# Patient Record
Sex: FEMALE | Race: WHITE | Employment: PART TIME | URBAN - METROPOLITAN AREA
[De-identification: names, ages, dates, MRNs, and addresses within clinical notes are randomized per-mention and may not be internally consistent; named-entity substitution may affect disease eponyms.]

---
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
## 2020-02-19 ENCOUNTER — HOSPITAL ENCOUNTER (OUTPATIENT)
Age: 20
Discharge: HOME | End: 2020-02-19
Payer: COMMERCIAL

## 2020-02-19 VITALS
DIASTOLIC BLOOD PRESSURE: 63 MMHG | WEIGHT: 130 LBS | BODY MASS INDEX: 25 KG/M2 | HEART RATE: 78 BPM | RESPIRATION RATE: 16 BRPM | TEMPERATURE: 98 F | OXYGEN SATURATION: 99 % | SYSTOLIC BLOOD PRESSURE: 106 MMHG

## 2020-02-19 DIAGNOSIS — O92.79 CLOGGED DUCT, POSTPARTUM: Primary | ICD-10-CM

## 2020-02-19 LAB
#MXD IC: 0.8 X10ˆ3/UL (ref 0.1–1)
HCT VFR BLD AUTO: 34 % (ref 35–48)
HGB BLD-MCNC: 11.4 G/DL (ref 12–16)
LYMPHOCYTES # BLD AUTO: 3.1 X10ˆ3/UL (ref 1.5–5)
LYMPHOCYTES NFR BLD AUTO: 32.9 %
MCH RBC QN AUTO: 27.3 PG (ref 26–34)
MCHC RBC AUTO-ENTMCNC: 33.5 G/DL (ref 31–37)
MCV RBC AUTO: 81.5 FL (ref 80–100)
MIXED CELL %: 8.1 %
NEUTROPHILS # BLD AUTO: 5.4 X10ˆ3/UL (ref 1.5–7.7)
NEUTROPHILS NFR BLD AUTO: 59 %
PLATELET # BLD AUTO: 269 X10ˆ3/UL (ref 150–450)
RBC # BLD AUTO: 4.17 X10ˆ6/UL (ref 3.8–5.3)
WBC # BLD AUTO: 9.3 X10ˆ3/UL (ref 4–11)

## 2020-02-19 PROCEDURE — 36415 COLL VENOUS BLD VENIPUNCTURE: CPT

## 2020-02-19 PROCEDURE — 85025 COMPLETE CBC W/AUTO DIFF WBC: CPT | Performed by: NURSE PRACTITIONER

## 2020-02-19 PROCEDURE — 99214 OFFICE O/P EST MOD 30 MIN: CPT

## 2020-02-19 PROCEDURE — 99213 OFFICE O/P EST LOW 20 MIN: CPT

## 2020-02-19 RX ORDER — AMOXICILLIN AND CLAVULANATE POTASSIUM 875; 125 MG/1; MG/1
1 TABLET, FILM COATED ORAL 2 TIMES DAILY
Qty: 20 TABLET | Refills: 0 | Status: SHIPPED | OUTPATIENT
Start: 2020-02-19 | End: 2020-02-29

## 2020-02-19 NOTE — ED INITIAL ASSESSMENT (HPI)
Pt here c/o rt breast pain for last couple days. Pt is breast feeding. States she feels like she had a temp last night.    Denies redness to breast.

## 2020-02-19 NOTE — ED PROVIDER NOTES
Patient Seen in: [address] [facility]      History   Patient presents with:  Breast Problem    Stated Complaint: breast pain/possible clogged milk duct    43-year-old female who presents to the immediate care with complaints of right lower bashir Pulse 78   Resp 16   Temp 97.8 °F (36.6 °C)   Temp src Temporal   SpO2 99 %   O2 Device None (Room air)       Current:/63   Pulse 78   Temp 97.8 °F (36.6 °C) (Temporal)   Resp 16   Wt 59 kg   SpO2 99%   Breastfeeding Unknown   BMI 24.56 kg/m² I have discussed with the patient and/or family the results of test, differential diagnosis, treatment plan, warning signs and symptoms which should prompt immediate return.   The patient and/or family expressed clear understanding of these instructions and

## 2020-12-31 ENCOUNTER — HOSPITAL ENCOUNTER (OUTPATIENT)
Age: 20
Discharge: OTHER INSTITUTION | End: 2020-12-31
Payer: COMMERCIAL

## 2020-12-31 VITALS
SYSTOLIC BLOOD PRESSURE: 123 MMHG | BODY MASS INDEX: 25.76 KG/M2 | WEIGHT: 140 LBS | TEMPERATURE: 98 F | HEART RATE: 64 BPM | OXYGEN SATURATION: 99 % | RESPIRATION RATE: 16 BRPM | HEIGHT: 62 IN | DIASTOLIC BLOOD PRESSURE: 65 MMHG

## 2020-12-31 DIAGNOSIS — M79.661 PAIN OF RIGHT CALF: Primary | ICD-10-CM

## 2020-12-31 PROCEDURE — 99203 OFFICE O/P NEW LOW 30 MIN: CPT | Performed by: NURSE PRACTITIONER

## 2020-12-31 NOTE — ED INITIAL ASSESSMENT (HPI)
Patient reports right calf pain that began yesterday. No swelling redness or warmth. Patient reports taking BCP and mother with history of blood clots.

## 2020-12-31 NOTE — ED PROVIDER NOTES
Patient Seen in: Immediate [address] [place] [address]      History   Patient presents with:  Pain    Stated Complaint: Leg pain     72-year-old female presents to the IC with complaints of right calf pain for the last couple days.   Patient did recently switch her birth Current:/65   Pulse 64   Temp 98.1 °F (36.7 °C) (Temporal)   Resp 16   Ht 157.5 cm (5' 2\")   Wt 63.5 kg   LMP 11/30/2020   SpO2 99%   Breastfeeding Yes   BMI 25.61 kg/m²         Physical Exam  Vitals signs and nursing note reviewed.    Constitutional I have discussed with the patient and/or family the results of test, differential diagnosis, treatment plan, warning signs and symptoms which should prompt immediate return.   The patient and/or family expressed clear understanding of these instructions and

## 2021-01-07 ENCOUNTER — HOSPITAL ENCOUNTER (OUTPATIENT)
Age: 21
Discharge: HOME | End: 2021-01-07
Payer: COMMERCIAL

## 2021-01-07 VITALS
TEMPERATURE: 98 F | SYSTOLIC BLOOD PRESSURE: 111 MMHG | HEART RATE: 78 BPM | OXYGEN SATURATION: 98 % | RESPIRATION RATE: 16 BRPM | DIASTOLIC BLOOD PRESSURE: 67 MMHG

## 2021-01-07 DIAGNOSIS — R43.2 LOSS OF TASTE: Primary | ICD-10-CM

## 2021-01-07 PROCEDURE — 99212 OFFICE O/P EST SF 10 MIN: CPT | Performed by: NURSE PRACTITIONER

## 2021-01-07 RX ORDER — LEVOTHYROXINE SODIUM 0.1 MG/1
100 TABLET ORAL
COMMUNITY
End: 2021-09-07

## 2021-01-07 NOTE — ED PROVIDER NOTES
Patient Seen in: Immediate [address] [facility]      History   Patient presents with:  Loss Of Smell Or Taste  Fatigue    Stated Complaint: loss of taste/smell    HPI/Subjective:   19-year-old female presents to the IC with 2 days of loss of taste and smell.   Mara Garcia (Temporal)   Resp 16   LMP 12/31/2020 (Exact Date)   SpO2 98%   Breastfeeding No         Physical Exam    GENERAL: The patient is well-developed well-nourished nontoxic, non-ill-appearing  HEENT: Normocephalic. Atraumatic.   Extraocular motions are intact

## 2021-01-08 LAB — SARS-COV-2 RNA,QUAL, RT-PCR: DETECTED

## 2021-09-07 ENCOUNTER — OFFICE VISIT (OUTPATIENT)
Dept: FAMILY MEDICINE CLINIC | Facility: CLINIC | Age: 21
End: 2021-09-07
Payer: MEDICAID

## 2021-09-07 VITALS
HEIGHT: 61 IN | HEART RATE: 90 BPM | WEIGHT: 146 LBS | SYSTOLIC BLOOD PRESSURE: 116 MMHG | OXYGEN SATURATION: 99 % | DIASTOLIC BLOOD PRESSURE: 72 MMHG | TEMPERATURE: 98 F | RESPIRATION RATE: 18 BRPM | BODY MASS INDEX: 27.56 KG/M2

## 2021-09-07 DIAGNOSIS — Z00.00 PHYSICAL EXAM, ANNUAL: Primary | ICD-10-CM

## 2021-09-07 DIAGNOSIS — Z76.89 ENCOUNTER TO ESTABLISH CARE: ICD-10-CM

## 2021-09-07 PROCEDURE — 3078F DIAST BP <80 MM HG: CPT | Performed by: FAMILY MEDICINE

## 2021-09-07 PROCEDURE — 3008F BODY MASS INDEX DOCD: CPT | Performed by: FAMILY MEDICINE

## 2021-09-07 PROCEDURE — 99385 PREV VISIT NEW AGE 18-39: CPT | Performed by: FAMILY MEDICINE

## 2021-09-07 PROCEDURE — 3074F SYST BP LT 130 MM HG: CPT | Performed by: FAMILY MEDICINE

## 2021-09-07 RX ORDER — FERROUS SULFATE 325(65) MG
325 TABLET ORAL 2 TIMES DAILY
COMMUNITY
Start: 2021-06-11

## 2021-09-07 RX ORDER — ASCORBIC ACID 100 MG
TABLET,CHEWABLE ORAL
COMMUNITY

## 2021-09-07 RX ORDER — VITAMIN B COMPLEX
TABLET ORAL
COMMUNITY

## 2021-09-07 RX ORDER — LEVOTHYROXINE SODIUM 137 UG/1
137 TABLET ORAL
COMMUNITY
Start: 2021-07-07

## 2021-09-07 NOTE — PROGRESS NOTES
HPI:   Hua Rose is a 24year old female who presents for a complete physical exam.   No concerns today. Hypothyroidism on meds sees endocrine labs done recently. No allergies   No breast no colon cancer in family.     Pap - 2021 neg  Mammogram   Has Outpatient Medications   Medication Sig Dispense Refill   • levothyroxine 137 MCG Oral Tab Take 137 mcg by mouth before breakfast.     • Ascorbic Acid (VITAMIN C) 100 MG Oral Chew Tab Chew by mouth.      • Cholecalciferol (VITAMIN D) 25 MCG (1000 UT) Oral T normocephalic,ears and throat are clear  EYES:PERRLA, conjunctiva are clear  NECK: supple,no adenopathy,no bruits. No thyromegaly  BREAST: by ob.   LUNGS: clear to auscultation  CARDIO: RRR without murmur  GI: soft, good BS's,no masses, HSM or tenderness  G

## 2021-09-07 NOTE — PATIENT INSTRUCTIONS
Exercise for a Healthier Heart     Exercise with a friend. When activity is fun, you're more likely to stick with it. You may wonder how you can improve the health of your heart. If you’re thinking about exercise, you’re on the right track.  You don’t n you enjoy. Walking is one of the easiest things you can do. You can also try swimming, riding a bike, dancing, or taking an exercise class.   Stop exercising and call your doctor if you:  · Have chest pain or feel dizzy or lightheaded  · Feel burning, tight molasses. Cut your usual amount by half. · Use non-sugar sweeteners. Stevia, aspartame, and sucralose can satisfy a sweet tooth without adding calories. · Swap out sugar-filled soda and other drinks. Buy sugar-free or low-calorie beverages.  Remember albaro your food with herbs and flavorings. Try lemon, garlic, and onion, or salt-free herb seasonings. · Limit convenience foods, such as boxed or canned foods and restaurant food. · Read food labels and choose lower-sodium options. Step 3.  Limit sugar  ·

## 2021-09-08 ENCOUNTER — MED REC SCAN ONLY (OUTPATIENT)
Dept: FAMILY MEDICINE CLINIC | Facility: CLINIC | Age: 21
End: 2021-09-08

## 2021-11-16 ENCOUNTER — HOSPITAL ENCOUNTER (OUTPATIENT)
Age: 21
Discharge: HOME | End: 2021-11-16
Payer: COMMERCIAL

## 2021-11-16 VITALS
BODY MASS INDEX: 26.68 KG/M2 | WEIGHT: 145 LBS | OXYGEN SATURATION: 99 % | SYSTOLIC BLOOD PRESSURE: 109 MMHG | DIASTOLIC BLOOD PRESSURE: 70 MMHG | HEART RATE: 73 BPM | RESPIRATION RATE: 16 BRPM | HEIGHT: 62 IN | TEMPERATURE: 98 F

## 2021-11-16 DIAGNOSIS — J02.9 VIRAL PHARYNGITIS: Primary | ICD-10-CM

## 2021-11-16 PROCEDURE — 99203 OFFICE O/P NEW LOW 30 MIN: CPT | Performed by: NURSE PRACTITIONER

## 2021-11-16 PROCEDURE — 87880 STREP A ASSAY W/OPTIC: CPT | Performed by: NURSE PRACTITIONER

## 2021-11-16 NOTE — ED PROVIDER NOTES
Patient Seen in: Immediate [facility]      History   Patient presents with:  Sore Throat    Stated Complaint: sore throat with coughing     Subjective:   26-year-old female who presents IC with complaints of sore throat and cough.   Symptoms going on sin kg/m²         Physical Exam    GENERAL: The patient is well-developed well-nourished nontoxic, non-ill-appearing  HEENT: Normocephalic. Atraumatic. Extraocular motions are intact  NECK: Supple. No meningitic signs are noted.    CHEST/LUNGS: Clear to ausc

## 2024-10-22 ENCOUNTER — PATIENT OUTREACH (OUTPATIENT)
Dept: FAMILY MEDICINE CLINIC | Facility: CLINIC | Age: 24
End: 2024-10-22

## 2024-12-11 ENCOUNTER — TELEPHONE (OUTPATIENT)
Dept: ENDOCRINOLOGY | Age: 24
End: 2024-12-11

## 2025-02-24 ENCOUNTER — APPOINTMENT (OUTPATIENT)
Dept: ENDOCRINOLOGY | Age: 25
End: 2025-02-24

## 2025-02-24 VITALS
HEART RATE: 64 BPM | BODY MASS INDEX: 31.91 KG/M2 | HEIGHT: 61 IN | WEIGHT: 169 LBS | DIASTOLIC BLOOD PRESSURE: 62 MMHG | SYSTOLIC BLOOD PRESSURE: 100 MMHG

## 2025-02-24 DIAGNOSIS — E55.9 VITAMIN D DEFICIENCY: ICD-10-CM

## 2025-02-24 DIAGNOSIS — E06.3 HYPOTHYROIDISM DUE TO HASHIMOTO THYROIDITIS: Primary | ICD-10-CM

## 2025-02-24 DIAGNOSIS — Z83.3 FAMILY HISTORY OF DIABETES MELLITUS (DM): ICD-10-CM

## 2025-02-24 PROCEDURE — 99204 OFFICE O/P NEW MOD 45 MIN: CPT | Performed by: INTERNAL MEDICINE

## 2025-02-24 RX ORDER — ACETAMINOPHEN AND CODEINE PHOSPHATE 120; 12 MG/5ML; MG/5ML
SOLUTION ORAL
COMMUNITY

## 2025-02-24 RX ORDER — LEVOTHYROXINE SODIUM 125 UG/1
125 TABLET ORAL DAILY
Qty: 90 TABLET | Refills: 3 | Status: SHIPPED | OUTPATIENT
Start: 2025-02-24

## 2025-02-25 LAB
25(OH)D3+25(OH)D2 SERPL-MCNC: 19 NG/ML (ref 30–100)
ALBUMIN SERPL-MCNC: 4.7 G/DL (ref 3.6–5.1)
ALBUMIN/GLOB SERPL: 1.7 (CALC) (ref 1–2.5)
ALP SERPL-CCNC: 65 U/L (ref 31–125)
ALT SERPL-CCNC: 10 U/L (ref 6–29)
AST SERPL-CCNC: 16 U/L (ref 10–30)
BILIRUB SERPL-MCNC: 0.5 MG/DL (ref 0.2–1.2)
BUN SERPL-MCNC: 20 MG/DL (ref 7–25)
BUN/CREAT SERPL: NORMAL (CALC) (ref 6–22)
CALCIUM SERPL-MCNC: 9.8 MG/DL (ref 8.6–10.2)
CHLORIDE SERPL-SCNC: 107 MMOL/L (ref 98–110)
CO2 SERPL-SCNC: 24 MMOL/L (ref 20–32)
CREAT SERPL-MCNC: 0.81 MG/DL (ref 0.5–0.96)
EGFRCR SERPLBLD CKD-EPI 2021: 104 ML/MIN/1.73M2
GLOBULIN SER CALC-MCNC: 2.8 G/DL (CALC) (ref 1.9–3.7)
GLUCOSE SERPL-MCNC: 72 MG/DL (ref 65–139)
HBA1C MFR BLD: 5.2 % OF TOTAL HGB
POTASSIUM SERPL-SCNC: 4 MMOL/L (ref 3.5–5.3)
PROT SERPL-MCNC: 7.5 G/DL (ref 6.1–8.1)
SODIUM SERPL-SCNC: 141 MMOL/L (ref 135–146)
T4 FREE SERPL-MCNC: 0.4 NG/DL (ref 0.8–1.8)
THYROPEROXIDASE AB SERPL-ACNC: >900 IU/ML
TSH SERPL-ACNC: >150 MIU/L

## 2025-02-25 RX ORDER — ACETAMINOPHEN 160 MG
50 TABLET,DISINTEGRATING ORAL DAILY
Qty: 100 CAPSULE | Refills: 3 | Status: SHIPPED | OUTPATIENT
Start: 2025-02-25

## 2025-03-15 ENCOUNTER — HOSPITAL ENCOUNTER (OUTPATIENT)
Age: 25
Discharge: HOME | End: 2025-03-15
Payer: COMMERCIAL

## 2025-03-15 VITALS
RESPIRATION RATE: 16 BRPM | HEART RATE: 80 BPM | DIASTOLIC BLOOD PRESSURE: 60 MMHG | OXYGEN SATURATION: 99 % | SYSTOLIC BLOOD PRESSURE: 101 MMHG | WEIGHT: 170 LBS | TEMPERATURE: 98 F | BODY MASS INDEX: 32.1 KG/M2 | HEIGHT: 61 IN

## 2025-03-15 DIAGNOSIS — R39.9 UTI SYMPTOMS: ICD-10-CM

## 2025-03-15 DIAGNOSIS — R21 RASH: Primary | ICD-10-CM

## 2025-03-15 LAB
BILIRUB UR QL STRIP: NEGATIVE
GLUCOSE UR STRIP-MCNC: NEGATIVE MG/DL
KETONES UR STRIP-MCNC: NEGATIVE MG/DL
NITRITE UR QL STRIP: NEGATIVE
PH UR STRIP: 5.5 [PH]
PROT UR STRIP-MCNC: 30 MG/DL
SP GR UR STRIP: 1.02
UROBILINOGEN UR STRIP-ACNC: <2 MG/DL

## 2025-03-15 PROCEDURE — 87086 URINE CULTURE/COLONY COUNT: CPT | Performed by: NURSE PRACTITIONER

## 2025-03-15 RX ORDER — NITROFURANTOIN 25; 75 MG/1; MG/1
100 CAPSULE ORAL 2 TIMES DAILY
Qty: 10 CAPSULE | Refills: 0 | Status: SHIPPED | OUTPATIENT
Start: 2025-03-15 | End: 2025-03-20

## 2025-03-15 NOTE — ED INITIAL ASSESSMENT (HPI)
Pt sts went to ER 2 days ago with abd pain. Dx'd with UTI, left ovarian cyst, gastritis. Give IV Rocephin and prescribed oral Keflex. Sts she has not started the Keflex yet. Yesterday afternoon began with red rash to yari forearms and abd. Today rash is worse- yari arms/hands, yari groin/upper legs. Denies new soaps/lotions/food.

## 2025-03-17 PROBLEM — E06.3 HYPOTHYROIDISM DUE TO HASHIMOTO'S THYROIDITIS: Status: ACTIVE | Noted: 2020-09-08

## 2025-03-17 NOTE — ED PROVIDER NOTES
Patient Seen in: [facility] [place]      History     Chief Complaint   Patient presents with    Rash Skin Problem     Stated Complaint: Rash    Subjective:   24-year-old female presents with complaint of a non-pruritic red rash to her bilateral forearms, upper thighs, chest, abdomen, and back that began 1 day ago.  2 days ago patient was seen in the ED due to abdominal pain and was diagnosed with a left ovarian cyst, gastritis, and acute cystitis.  She was given IV Rocephin and a prescription for cephalexin.  Patient concerned that the Rocephin caused this rash.  Patient has not yet started the cephalexin.  Does not have any known allergies to medications or environmental substances.  No longer has any abdominal pain.   Patient denies any use of new detergents, soaps, lotions, perfumes, foods, or new pets.    Patient denies fever, chills, lip/tongue swelling, wheezing, shortness of breath, dizziness, nausea, vomiting.         The history is provided by the patient.           Objective:     Past Medical History:    Migraines    Thyroid disease              History reviewed. No pertinent surgical history.             Social History     Socioeconomic History    Marital status: Single   Tobacco Use    Smoking status: Passive Smoke Exposure - Never Smoker    Smokeless tobacco: Never    Tobacco comments:     NON-SMOKER   Vaping Use    Vaping status: Never Used   Substance and Sexual Activity    Alcohol use: Yes     Comment: SOC    Drug use: Never     Social Drivers of Health     Food Insecurity: No Food Insecurity (11/4/2024)    Received from [facility]    Food Insecurity     Currently or in the past 3 months, have you worried your food would run out before you had money to buy more?: No     In the past 12 months, have you run out of food or been unable to get more?: No   Transportation Needs: No Transportation Needs (11/4/2024)    Received from [facility]    Transportation  Needs     Currently or in the past 3 months, has lack of transportation kept you from medical appointments, getting food or medicine, or providing care to a family member?: Unrecognized value     Medical Transportation Needs?: No   Housing Stability: Low Risk  (8/18/2022)    Received from [facility]    Housing Stability     Mortgage Payment Concerns?: No     Unstable Housing?: No              Review of Systems   Constitutional:  Negative for chills and fever.   HENT:  Negative for facial swelling and trouble swallowing.    Respiratory:  Negative for shortness of breath, wheezing and stridor.    Gastrointestinal:  Negative for abdominal pain, diarrhea, nausea and vomiting.   Genitourinary:  Negative for dysuria and frequency.   Skin:  Positive for rash.   Neurological:  Negative for dizziness.       Positive for stated complaint: Rash  Other systems are as noted in HPI.  Constitutional and vital signs reviewed.      All other systems reviewed and negative except as noted above.    Physical Exam     ED Triage Vitals [03/15/25 1119]   /60   Pulse 80   Resp 16   Temp 98.4 °F (36.9 °C)   Temp src Oral   SpO2 99 %   O2 Device None (Room air)       Current Vitals:   No data recorded    Physical Exam  Vitals and nursing note reviewed.   Constitutional:       General: She is not in acute distress.     Appearance: Normal appearance. She is not ill-appearing, toxic-appearing or diaphoretic.   HENT:      Head: Normocephalic.      Mouth/Throat:      Mouth: Mucous membranes are moist.      Pharynx: Oropharynx is clear.   Eyes:      Conjunctiva/sclera: Conjunctivae normal.   Cardiovascular:      Rate and Rhythm: Normal rate and regular rhythm.      Heart sounds: No murmur heard.  Pulmonary:      Effort: Pulmonary effort is normal.      Breath sounds: Normal breath sounds.   Abdominal:      General: Abdomen is flat. Bowel sounds are normal. There is no distension.      Palpations: Abdomen is soft.       Tenderness: There is no abdominal tenderness. There is no right CVA tenderness or left CVA tenderness.   Musculoskeletal:      Cervical back: Normal range of motion and neck supple.   Skin:     General: Skin is warm and dry.      Findings: Rash present. Rash is macular (Scattered, erythematous macules to trunk, back, extremities.).      Comments:     Neurological:      Mental Status: She is alert.   Psychiatric:         Mood and Affect: Mood normal.             ED Course     Labs Reviewed   [facility] POCT URINALYSIS DIPSTICK - Abnormal; Notable for the following components:       Result Value    Urine Clarity Cloudy (*)     Protein urine 30 (*)     Blood, Urine Trace-Intact (*)     Leukocyte esterase urine Moderate (*)     All other components within normal limits   URINE CULTURE, ROUTINE        MDM     Medical Decision Making  24-year-old female presents with complaint of a non-pruritic red rash to her bilateral forearms, upper thighs, chest, abdomen, and back that began 1 day ago.   Two days ago patient was seen in the ED due to abdominal pain and was diagnosed with a left ovarian cyst, gastritis, and acute cystitis.  She was given IV Rocephin and a prescription for cephalexin.  Patient concerned that the Rocephin caused this rash.     Differential diagnoses include morbilliform rash from drug reaction, viral illness, other allergic reaction.  On exam patient is well-appearing, vitals are normal, no angioedema, no abdominal tenderness.  Reviewed labs from the emergency room (2 days ago).  Nitrites and leuks were present in urine.  Will collect another UA and culture and start patient on Macrobid.  Dose of Decadron given in clinic.  Take Zyrtec as discussed.  Push fluids.  ED precautions emphasized if any symptoms worsen, or new concerning symptoms develop.  PCP follow-up.  Patient agreeable to plan.      Amount and/or Complexity of Data Reviewed  External Data Reviewed: labs, radiology and notes.  Labs:  ordered.    Risk  OTC drugs.  Prescription drug management.        Disposition and Plan     Clinical Impression:  1. Rash    2. UTI symptoms         Disposition:  Discharge  3/15/2025 12:04 pm    Follow-up:  Erica Doyle MD  [address] [id number] [phone]    Schedule an appointment as soon as possible for a visit             Medications Prescribed:  Discharge Medication List as of 3/15/2025 12:04 PM        START taking these medications    Details   nitrofurantoin monohydrate macro 100 MG Oral Cap Take 1 capsule (100 mg total) by mouth 2 (two) times daily for 5 days., Normal, Disp-10 capsule, R-0                 Supplementary Documentation:

## 2025-03-17 NOTE — ED NOTES
General message left to call ic back with test results. Phone number provided. Urine culture was negative. Pt can stop macrobid.

## 2025-06-03 ENCOUNTER — APPOINTMENT (OUTPATIENT)
Dept: ENDOCRINOLOGY | Age: 25
End: 2025-06-03

## (undated) NOTE — ED AVS SNAPSHOT
[facility] in [address] [address]    Phone:  [phone]    Fax:  [address]   MRN: [id number]    Department:  [facility] [place] ACUTE MEDICAL [facility]   Date of Visit:  2/19/2017           Diagn If you have any problems with your follow-up, please call our  at [phone]. Si usted tiene algun problema con ramirez sequimiento, por favor llame a nuestro adminstrador de casos al [phone].     Expect to receive an electronic reques Leelee Castro [address] [facility] ([address]) 1000 [facility] [address] [place] [address]. ([address] [facility] [address]) [address]  ([address] [employer] [facility]) [address] [facility] Sign Up Forms link in the Additional Information box on the right. [email or web address] Questions? Call [phone] for help. GivU is NOT to be used for urgent needs. For medical emergencies, dial 911.

## (undated) NOTE — LETTER
Date & Time: 11/16/2021, 1:42 PM  Patient: Lakshmi Chavez  Encounter Provider(s):    AUSTIN Narvaez       To Whom It May Concern:    Shruthi Bazzi was seen and treated in our department on 11/16/2021.  She can return to work on 11/17/2021    If yo

## (undated) NOTE — LETTER
[facility] IN [address] [address]  Dept: [phone]  Dept Fax: [phone]         March 14, 2017    Patient: Kylah Ochoa   YOB: 2000   Date of Visit: 3/14/2017       To Whom It May Concern:    Lela Alaniz

## (undated) NOTE — ED AVS SNAPSHOT
[facility] in [facility] [address] [address]    Phone:  [phone]    Fax:  [address]   MRN: [id number]    Department:  [facility] [place]   Date of Visit:  1/18/2017           Diagn If you have any problems with your follow-up, please call our  at [phone]. Si usted tiene algun problema con ramirez sequimiento, por favor llame a nuestro adminstrador de casos al [phone].     Expect to receive an electronic reques Leelee Castro [address] [facility] ([address]) 1000 [facility] [address] [place] [address]. ([address] [facility] [address]) [address]  ([address] [employer] [facility]) [address] [facility] Sign Up Forms link in the Additional Information box on the right. moziy Questions? Call [phone] for help. moziy is NOT to be used for urgent needs. For medical emergencies, dial 911.

## (undated) NOTE — ED AVS SNAPSHOT
[facility] in  Yonatan Hale [address] [facility] [address] [address]    Phone:  [phone]    Fax:  [address]   MRN: [id number]    Department:  [facility] [place]   Date of Visit:  3/14/2017           Diagn from our patient liason soon after your visit. Also, some patients receive a detailed feedback survey mailed to them a week after the visit. If you receive this, we would really appreciate it if you could take the time to complete it. Thank you!       You [facility] [address] [place], [place] [address]y 30 (68 [facility] [id number] [address] ([address]) [facility] [address] 176 [facility]. ([address]) [facility]